# Patient Record
Sex: MALE | URBAN - METROPOLITAN AREA
[De-identification: names, ages, dates, MRNs, and addresses within clinical notes are randomized per-mention and may not be internally consistent; named-entity substitution may affect disease eponyms.]

---

## 2024-02-21 ENCOUNTER — TELEPHONE (OUTPATIENT)
Dept: HEMATOLOGY | Age: 69
End: 2024-02-21

## 2024-02-21 NOTE — TELEPHONE ENCOUNTER
The patient is scheduled for his new patient referral appt on 03/25/2024 [place] at 1:00 pm. Directions and address to the [place] office were given at the time of our call. The office phone number was also provided to the patient. The patient was instructed to bring his photo id and insurance card. The patient confirmed all of the above and all questions were answered at the time of our call  Electronically signed by Chasity Hawkins MA on 2/21/2024 at 1:14 PM

## 2024-03-25 ENCOUNTER — OFFICE VISIT (OUTPATIENT)
Dept: SURGERY | Age: 69
End: 2024-03-25
Payer: MEDICARE

## 2024-03-25 VITALS
BODY MASS INDEX: 28 KG/M2 | WEIGHT: 200 LBS | HEIGHT: 71 IN | HEART RATE: 84 BPM | DIASTOLIC BLOOD PRESSURE: 84 MMHG | OXYGEN SATURATION: 98 % | RESPIRATION RATE: 18 BRPM | SYSTOLIC BLOOD PRESSURE: 175 MMHG

## 2024-03-25 DIAGNOSIS — K76.0 HEPATIC STEATOSIS: Primary | ICD-10-CM

## 2024-03-25 DIAGNOSIS — R94.5 ABNORMAL RESULTS OF LIVER FUNCTION STUDIES: ICD-10-CM

## 2024-03-25 DIAGNOSIS — R93.2 ABNORMAL FINDINGS ON DIAGNOSTIC IMAGING OF LIVER AND BILIARY TRACT: ICD-10-CM

## 2024-03-25 DIAGNOSIS — F10.10 ALCOHOL ABUSE: ICD-10-CM

## 2024-03-25 PROCEDURE — 1123F ACP DISCUSS/DSCN MKR DOCD: CPT | Performed by: STUDENT IN AN ORGANIZED HEALTH CARE EDUCATION/TRAINING PROGRAM

## 2024-03-25 PROCEDURE — 99205 OFFICE O/P NEW HI 60 MIN: CPT | Performed by: STUDENT IN AN ORGANIZED HEALTH CARE EDUCATION/TRAINING PROGRAM

## 2024-03-25 RX ORDER — FLUTICASONE FUROATE, UMECLIDINIUM BROMIDE AND VILANTEROL TRIFENATATE 100; 62.5; 25 UG/1; UG/1; UG/1
1 POWDER RESPIRATORY (INHALATION) DAILY
COMMUNITY
Start: 2024-01-09

## 2024-03-25 RX ORDER — ASCORBIC ACID 500 MG
1000 TABLET ORAL DAILY
COMMUNITY

## 2024-03-25 RX ORDER — ROSUVASTATIN CALCIUM 20 MG/1
20 TABLET, COATED ORAL DAILY
COMMUNITY

## 2024-03-25 RX ORDER — LOSARTAN POTASSIUM AND HYDROCHLOROTHIAZIDE 12.5; 5 MG/1; MG/1
1 TABLET ORAL DAILY
COMMUNITY

## 2024-03-25 RX ORDER — ACETAMINOPHEN 160 MG
2000 TABLET,DISINTEGRATING ORAL DAILY
COMMUNITY

## 2024-03-25 RX ORDER — AMOXICILLIN 500 MG
CAPSULE ORAL 2 TIMES DAILY
COMMUNITY

## 2024-03-25 RX ORDER — POTASSIUM CHLORIDE 20 MEQ/1
20 TABLET, EXTENDED RELEASE ORAL 2 TIMES DAILY
COMMUNITY
Start: 2024-01-04

## 2024-03-25 RX ORDER — IBUPROFEN 200 MG
1 CAPSULE ORAL DAILY
COMMUNITY

## 2024-03-25 ASSESSMENT — ENCOUNTER SYMPTOMS
GASTROINTESTINAL NEGATIVE: 1
ALLERGIC/IMMUNOLOGIC NEGATIVE: 1
RESPIRATORY NEGATIVE: 1
EYES NEGATIVE: 1

## 2024-03-25 NOTE — PROGRESS NOTES
importance of alcohol cessation and incorporating a low fat diet.   -Will see her back in the office after labs and imaging             Thank you for the consultation allowing me to take part in Mr. Benoit's care.   60 Minutes of which greater than 50% was spent counseling or coordinating his care.      Shelly Carcamo MD

## 2024-04-12 ENCOUNTER — TELEPHONE (OUTPATIENT)
Dept: HEMATOLOGY | Age: 69
End: 2024-04-12

## 2024-04-12 DIAGNOSIS — K76.0 HEPATIC STEATOSIS: ICD-10-CM

## 2024-04-12 NOTE — TELEPHONE ENCOUNTER
Called and LVM for Da. Need to find out if patient ever had MRI completed at [facility] and make a follow up appt with Dr. Carcamo.   Da returned call and stated he got his MRI completed at [facility]. [facility] records faxed over MRI report and will be mailing disk copy.

## 2024-04-29 NOTE — TELEPHONE ENCOUNTER
Disk copy of Mri abdomen received from [facility]. Called and LVM with Da to call office and schedule follow up appt.